# Patient Record
Sex: MALE
[De-identification: names, ages, dates, MRNs, and addresses within clinical notes are randomized per-mention and may not be internally consistent; named-entity substitution may affect disease eponyms.]

---

## 2024-07-16 PROBLEM — Z00.00 ENCOUNTER FOR PREVENTIVE HEALTH EXAMINATION: Status: ACTIVE | Noted: 2024-07-16

## 2024-07-18 ENCOUNTER — APPOINTMENT (OUTPATIENT)
Dept: UROLOGY | Facility: CLINIC | Age: 89
End: 2024-07-18
Payer: MEDICARE

## 2024-07-18 VITALS
TEMPERATURE: 98.2 F | WEIGHT: 150 LBS | OXYGEN SATURATION: 99 % | BODY MASS INDEX: 23.54 KG/M2 | DIASTOLIC BLOOD PRESSURE: 65 MMHG | HEIGHT: 67 IN | HEART RATE: 87 BPM | SYSTOLIC BLOOD PRESSURE: 135 MMHG

## 2024-07-18 PROCEDURE — 99204 OFFICE O/P NEW MOD 45 MIN: CPT

## 2024-07-18 RX ORDER — UBIDECARENONE 30 MG
30 CAPSULE ORAL
Refills: 0 | Status: ACTIVE | COMMUNITY

## 2024-07-18 RX ORDER — AMLODIPINE AND ATORVASTATIN 2.5; 4 MG/1; MG/1
TABLET, COATED ORAL
Refills: 0 | Status: ACTIVE | COMMUNITY

## 2024-07-18 RX ORDER — TAMSULOSIN HCL 0.4 MG
0.4 CAPSULE ORAL
Refills: 0 | Status: ACTIVE | COMMUNITY

## 2024-07-18 RX ORDER — FINASTERIDE 5 MG/1
5 TABLET, FILM COATED ORAL
Refills: 0 | Status: ACTIVE | COMMUNITY

## 2024-07-18 RX ORDER — SIMVASTATIN 40 MG/1
40 TABLET, FILM COATED ORAL
Refills: 0 | Status: ACTIVE | COMMUNITY

## 2024-07-24 ENCOUNTER — APPOINTMENT (OUTPATIENT)
Dept: UROLOGY | Facility: CLINIC | Age: 89
End: 2024-07-24
Payer: MEDICARE

## 2024-07-24 ENCOUNTER — TRANSCRIPTION ENCOUNTER (OUTPATIENT)
Age: 89
End: 2024-07-24

## 2024-07-24 DIAGNOSIS — Z87.898 PERSONAL HISTORY OF OTHER SPECIFIED CONDITIONS: ICD-10-CM

## 2024-07-24 PROCEDURE — 99215 OFFICE O/P EST HI 40 MIN: CPT

## 2024-07-25 PROBLEM — Z87.898 HISTORY OF URINARY RETENTION: Status: ACTIVE | Noted: 2024-07-25

## 2024-07-25 NOTE — HISTORY OF PRESENT ILLNESS
[Home] : at home, [unfilled] , at the time of the visit. [Medical Office: ([facility])___] : at the medical office located in  [Verbal consent obtained from patient] : the patient, [unfilled] [FreeTextEntry1] : CC: Urinary retention  89 year old male who presented to outside facility for gross hematuria and urinary incontinence on 6/14/24.  Was hospitalized 6/4/24 and essentially told it was a vasovagal episode.  Since his hospitalization he was unable to void Urinary symptoms which started 7 months prior to episode was nocturia x 3-4 and sense of incomplete emptying. He was feeling more pressure in the abdomen.  Catheter inserted on 6/14/24 by Dr. Mims, was noted to have 1600 cc in his bladder. Per notes prostate volume of 49 g on renal/bladder US with mild bilateral hydronephrosis.   On finasteride 5 mg and tamsulosin 0.8 mg which was initiated after catheter insertion.  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  Cystoscopy 6/20/24 for hematuria- debris and irritation of bladder wall noted but reports states " surprising of pretty open prostate"  Uroflow 6/21/24 Voided volume: 201.2 Max Flow: 9.3 Average Flow : 5.7 Residual: 720  He was left without a catheter on 6/21/24 and taught how to perform CIC. Since then, he has had increasing volume of voids, recording all urinations and catheterization amounts. He has been urinating approximately one liter or more total for the day, with each catheterization yielding 100 to 150 milliliters. He reports that his stream has been increasing in strength, and he has found it more comfortable with less frequency and urgency overall.   UDS 7/3/24-low detrusor pressures, with pelvic discomfort and inability to empty bladder during procedure.  Medical Hx: Elevated Cr ( CKD on top of LUSI DANIEL), HTN, elevated cholesterol  Surgical Hx: Cardiac stent 5 yrs ago on ASA, lap cholecystectomy  Social Hx: nonsmoker, no ETOH, retired physicist  Family Hx: brother kidney cancer- radical nephrectomy, no CaP

## 2024-07-25 NOTE — ASSESSMENT
[FreeTextEntry1] : Mr. Dial has a history of BPH with a prostate volume of 49 grams, complicated by urinary retention, bilateral hydronephrosis, and acute kidney injury. He is currently managing his condition with clean intermittent catheterization, Flomax, and Finasteride. He has been voiding with better volumes and low catheterized volumes, which is reassuring and likely a result of starting flomax and finasteride. It is possible that his urination will continue to improve over the next few months.  Typically, when a patient has urinary retention that is complicated by acute kidney injury and hydronephrosis, I do recommend a prostate debulking procedure in order to protect future kidney function. However, given the circumstances of the retention, which included urinary tract infection, as well as the patient's age, comorbidities and his increasing ability to avoid without difficulty, I think it would be reasonable to monitor him very closely before committing to a prostate debulking procedure.   Discussed potential management options for urinary retention, including a chronic Ahyes catheter, clean intermittent catheterization, suprapubic tube, or prostate debulking procedure. We discussed laser vaproization vs laser enucleation. If proceeding with surgery, would recommend HoLEP for maximal debulking given extent of retention and underactive bladder on UDS.  - Continue clean intermittent catheterization, with a plan to gradually decrease the frequency from three times daily to two times daily, and potentially further if catheterized volumes remain low. Monitor for recurrent urinary retention and kidney injury. - Obtain a renal and bladder ultrasound in approximately one month to assess post-void residual and resolution of hydronephrosis.  - F/U either virtual or in-person, in approximately two months to assess progress. Instructed the patient to notify the office or present to the emergency room if he experiences difficulties catheterizing, develops symptoms of infection (fever, chills, pain), or signs of urinary retention.

## 2024-09-24 ENCOUNTER — APPOINTMENT (OUTPATIENT)
Dept: UROLOGY | Facility: CLINIC | Age: 89
End: 2024-09-24
Payer: MEDICARE

## 2024-09-24 DIAGNOSIS — Z87.898 PERSONAL HISTORY OF OTHER SPECIFIED CONDITIONS: ICD-10-CM

## 2024-09-24 PROCEDURE — 99213 OFFICE O/P EST LOW 20 MIN: CPT

## 2024-09-24 NOTE — ASSESSMENT
[FreeTextEntry1] : Mr. Dial has a history of BPH with a prostate volume of 49 grams, complicated by urinary retention, bilateral hydronephrosis, and acute kidney injury. He is currently managing his condition with clean intermittent catheterization, Flomax, and Finasteride. He has been voiding with better volumes and low catheterized volumes, which is reassuring and likely a result of starting flomax and finasteride. He is now catheterizing every few days with volumes about 100 cc, much improved from prior. No infections, difficulty catheterizing or hematuria.   Typically, when a patient has urinary retention that is complicated by acute kidney injury and hydronephrosis, I do recommend a prostate debulking procedure in order to protect future kidney function. However, given the circumstances of the retention, which included urinary tract infection, as well as the patient's age, comorbidities and his ability to avoid without difficulty now I think it would be reasonable to continue to monitor him. He is very hesitant to have surgery and prefers to continue CIC every few days to ensure adequate emptying and prevent overdistension of the bladder.  - Continue clean intermittent catheterization every few days. Monitor for recurrent urinary retention and kidney injury. - Obtain a renal and bladder ultrasound with nephrologist closer to home to assess post-void residual and resolution of hydronephrosis.  - F/U either virtual or in-person in 3 months. Instructed the patient to notify the office or present to the emergency room if he experiences difficulties catheterizing, develops symptoms of infection (fever, chills, pain), or signs of urinary retention.

## 2024-09-24 NOTE — HISTORY OF PRESENT ILLNESS
[Home] : at home, [unfilled] , at the time of the visit. [Medical Office: ([facility])___] : at the medical office located in  [Verbal consent obtained from patient] : the patient, [unfilled] [FreeTextEntry1] : CC: Urinary retention  7/24/24: 89 year old male who presented to outside facility for gross hematuria and urinary incontinence on 6/14/24.  Was hospitalized 6/4/24 and essentially told it was a vasovagal episode.  Since his hospitalization he was unable to void Urinary symptoms which started 7 months prior to episode was nocturia x 3-4 and sense of incomplete emptying. He was feeling more pressure in the abdomen.  Catheter inserted on 6/14/24 by Dr. Mims, was noted to have 1600 cc in his bladder. Per notes prostate volume of 49 g on renal/bladder US with mild bilateral hydronephrosis.   On finasteride 5 mg and tamsulosin 0.8 mg which was initiated after catheter insertion.  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  Cystoscopy 6/20/24 for hematuria- debris and irritation of bladder wall noted but reports states " surprising of pretty open prostate"  Uroflow 6/21/24 Voided volume: 201.2 Max Flow: 9.3 Average Flow : 5.7 Residual: 720  He was left without a catheter on 6/21/24 and taught how to perform CIC. Since then, he has had increasing volume of voids, recording all urinations and catheterization amounts. He has been urinating approximately one liter or more total for the day, with each catheterization yielding 100 to 150 milliliters. He reports that his stream has been increasing in strength, and he has found it more comfortable with less frequency and urgency overall.   UDS 7/3/24-low detrusor pressures, with pelvic discomfort and inability to empty bladder during procedure.  Medical Hx: Elevated Cr ( CKD on top of LUIS DANIEL), HTN, elevated cholesterol  Surgical Hx: Cardiac stent 5 yrs ago on ASA, lap cholecystectomy  Social Hx: nonsmoker, no ETOH, retired physicist  Family Hx: brother kidney cancer- radical nephrectomy, no CaP  9/24/24: Catheterizing once every few days, getting about 100 cc each time. Urinating without difficulty otherwise. No infections, hematuria, difficulty catheterizing.  Hsa not had follow up renal ultrasound to ensure resolution of hydronephrosis.